# Patient Record
(demographics unavailable — no encounter records)

---

## 2024-12-13 NOTE — PHYSICAL EXAM
[Normal Sclera/Conjunctiva] : normal sclera/conjunctiva [Normal Oropharynx] : the oropharynx was normal [No Lymphadenopathy] : no lymphadenopathy [No Edema] : there was no peripheral edema [Soft] : abdomen soft [Non Tender] : non-tender [Non-distended] : non-distended [Normal Gait] : normal gait [Alert and Oriented x3] : oriented to person, place, and time [Normal] : affect was normal and insight and judgment were intact

## 2024-12-13 NOTE — HEALTH RISK ASSESSMENT
[Little interest or pleasure doing things] : 1) Little interest or pleasure doing things [Feeling down, depressed, or hopeless] : 2) Feeling down, depressed, or hopeless [0] : 2) Feeling down, depressed, or hopeless: Not at all (0) [PHQ-2 Negative - No further assessment needed] : PHQ-2 Negative - No further assessment needed [Never] : Never [HIV Test offered] : HIV Test offered [Hepatitis C test offered] : Hepatitis C test offered [Fully functional (bathing, dressing, toileting, transferring, walking, feeding)] : Fully functional (bathing, dressing, toileting, transferring, walking, feeding) [Fully functional (using the telephone, shopping, preparing meals, housekeeping, doing laundry, using] : Fully functional and needs no help or supervision to perform IADLs (using the telephone, shopping, preparing meals, housekeeping, doing laundry, using transportation, managing medications and managing finances) [Good] : ~his/her~  mood as  good [1 or 2 (0 pts)] : 1 or 2 (0 points) [Never (0 pts)] : Never (0 points) [No] : In the past 12 months have you used drugs other than those required for medical reasons? No [No falls in past year] : Patient reported no falls in the past year [Time Spent: ___ Minutes] : I spent [unfilled] minutes performing a depression screening for this patient. [NO] : No [Behavior] : difficulty with behavior [Behavioral] : behavioral [Health Literacy] : health literacy [With Family] : lives with family [On disability] : on disability [Single] : single [Feels Safe at Home] : Feels safe at home [Smoke Detector] : smoke detector [Seat Belt] :  uses seat belt [Audit-CScore] : 0 [de-identified] : Never smoked [Change in mental status noted] : No change in mental status noted [Sexually Active] : not sexually active [High Risk Behavior] : no high risk behavior [Reports changes in hearing] : Reports no changes in hearing [Reports changes in vision] : Reports no changes in vision [Reports changes in dental health] : Reports no changes in dental health [de-identified] : Hx of Autism  [TextEntry] : Meds: Zyrtec for seasonal allergies  Allergies: NKDA  Hospitalizations: None Surgical Hx: Tooth extraction under general anesthesia  Social Hx:  Smoking:  None Alcohol: None Recreational Drugs: None Sexual Hx: Not sexually active  Diet: Kosher  Exercise: Not currently  Occupation/Education: None currently  Living Situation: Lives w/ mom, brother,  - stepfather  Marital Status: Not   Feels Safe in Relationship: Yes Children/Family: None Immunizations/Vaccines: Covid: 1st two doses and 2 boosters Flu: will do today  Shingles: N/A Pneumovax: N/A Tdap: Completed in 2023

## 2024-12-13 NOTE — HISTORY OF PRESENT ILLNESS
[de-identified] : 24M w/ PMH of autism here to establish care and CPE  Patient is accompanied by mom, who is also one of my patients, and works as RN in [employer].   Pt's mom has concern of R. big toenail yellow discoloration. Also of concern is a rash on his back that is sometimes itchy for patient.   Patient has hx of Autism and has special needs. Is in a dayhab program M-F, 8:30-2:30pm. Of note, he has medication hx of abilify when he was a child due to outbursts which has increased his appetite. BMI today is 38.

## 2024-12-13 NOTE — HEALTH RISK ASSESSMENT
[Little interest or pleasure doing things] : 1) Little interest or pleasure doing things [Feeling down, depressed, or hopeless] : 2) Feeling down, depressed, or hopeless [0] : 2) Feeling down, depressed, or hopeless: Not at all (0) [PHQ-2 Negative - No further assessment needed] : PHQ-2 Negative - No further assessment needed [Never] : Never [HIV Test offered] : HIV Test offered [Hepatitis C test offered] : Hepatitis C test offered [Fully functional (bathing, dressing, toileting, transferring, walking, feeding)] : Fully functional (bathing, dressing, toileting, transferring, walking, feeding) [Fully functional (using the telephone, shopping, preparing meals, housekeeping, doing laundry, using] : Fully functional and needs no help or supervision to perform IADLs (using the telephone, shopping, preparing meals, housekeeping, doing laundry, using transportation, managing medications and managing finances) [Good] : ~his/her~  mood as  good [1 or 2 (0 pts)] : 1 or 2 (0 points) [Never (0 pts)] : Never (0 points) [No] : In the past 12 months have you used drugs other than those required for medical reasons? No [No falls in past year] : Patient reported no falls in the past year [Time Spent: ___ Minutes] : I spent [unfilled] minutes performing a depression screening for this patient. [NO] : No [Behavior] : difficulty with behavior [Behavioral] : behavioral [Health Literacy] : health literacy [With Family] : lives with family [On disability] : on disability [Single] : single [Feels Safe at Home] : Feels safe at home [Smoke Detector] : smoke detector [Seat Belt] :  uses seat belt [Audit-CScore] : 0 [de-identified] : Never smoked [Change in mental status noted] : No change in mental status noted [Sexually Active] : not sexually active [High Risk Behavior] : no high risk behavior [Reports changes in hearing] : Reports no changes in hearing [Reports changes in vision] : Reports no changes in vision [Reports changes in dental health] : Reports no changes in dental health [de-identified] : Hx of Autism  [TextEntry] : Meds: Zyrtec for seasonal allergies  Allergies: NKDA  Hospitalizations: None Surgical Hx: Tooth extraction under general anesthesia  Social Hx:  Smoking:  None Alcohol: None Recreational Drugs: None Sexual Hx: Not sexually active  Diet: Kosher  Exercise: Not currently  Occupation/Education: None currently  Living Situation: Lives w/ mom, brother,  - stepfather  Marital Status: Not   Feels Safe in Relationship: Yes Children/Family: None Immunizations/Vaccines: Covid: 1st two doses and 2 boosters Flu: will do today  Shingles: N/A Pneumovax: N/A Tdap: Completed in 2023

## 2024-12-13 NOTE — PLAN
[FreeTextEntry1] : #CPE -Labwork -Flu shot today -UTD on tdap   #Obesity Discussed potential use of GLP-1 agonist for which patient's mom would like to avoid unless medically necessary such as diagnosis of DM. Patient will attempt for increased movement and exercise albeit challenging given special needs. Will need to closely monitor.   #Onychomycosis of great r. toe Topical agent will be more preferable for adherence and to limit side effects. Will trial topical azole bid for at least two weeks. If no improvement can refer to podiatry.   #Rash Back rash possible atopic dermatitis. -topical steroid -derm referral   F/U: As needed/annuals

## 2024-12-13 NOTE — HISTORY OF PRESENT ILLNESS
[de-identified] : 24M w/ PMH of autism here to establish care and CPE  Patient is accompanied by mom, who is also one of my patients, and works as RN in [employer].   Pt's mom has concern of R. big toenail yellow discoloration. Also of concern is a rash on his back that is sometimes itchy for patient.   Patient has hx of Autism and has special needs. Is in a dayhab program M-F, 8:30-2:30pm. Of note, he has medication hx of abilify when he was a child due to outbursts which has increased his appetite. BMI today is 38.